# Patient Record
Sex: FEMALE | Race: BLACK OR AFRICAN AMERICAN | NOT HISPANIC OR LATINO | URBAN - NONMETROPOLITAN AREA
[De-identification: names, ages, dates, MRNs, and addresses within clinical notes are randomized per-mention and may not be internally consistent; named-entity substitution may affect disease eponyms.]

---

## 2021-07-08 ENCOUNTER — IMPORTED ENCOUNTER (OUTPATIENT)
Dept: URBAN - NONMETROPOLITAN AREA CLINIC 1 | Facility: CLINIC | Age: 31
End: 2021-07-08

## 2021-07-08 PROBLEM — H52.13: Noted: 2021-07-08

## 2021-07-08 PROBLEM — E11.9: Noted: 2021-07-08

## 2021-07-08 PROCEDURE — 99204 OFFICE O/P NEW MOD 45 MIN: CPT

## 2021-07-08 NOTE — PATIENT DISCUSSION
NIDDM sans Retinopathy Discussed diagnosis with patient. Discussed the risk of diabetic damage of the retina with potential vision loss and the importance of routine follow-up. Emphasized strict blood sugar control. Will send notesContinue to monitor. Myopia OUDiscussed refractive status in detail with patient. No glasses needed at this time. Continue to monitor.

## 2022-04-10 ASSESSMENT — TONOMETRY
OS_IOP_MMHG: 18
OD_IOP_MMHG: 18

## 2022-04-10 ASSESSMENT — VISUAL ACUITY
OD_CC: 20/20
OS_CC: 20/15

## 2022-07-12 ENCOUNTER — ESTABLISHED PATIENT (OUTPATIENT)
Dept: RURAL CLINIC 3 | Facility: CLINIC | Age: 32
End: 2022-07-12

## 2022-07-12 DIAGNOSIS — H52.13: ICD-10-CM

## 2022-07-12 DIAGNOSIS — E11.9: ICD-10-CM

## 2022-07-12 PROCEDURE — 92014 COMPRE OPH EXAM EST PT 1/>: CPT

## 2022-07-12 PROCEDURE — 92015 DETERMINE REFRACTIVE STATE: CPT

## 2022-07-12 ASSESSMENT — VISUAL ACUITY
OD_SC: 20/20
OS_SC: 20/20

## 2022-07-12 ASSESSMENT — TONOMETRY
OD_IOP_MMHG: 18
OS_IOP_MMHG: 18

## 2023-07-18 ENCOUNTER — ESTABLISHED PATIENT (OUTPATIENT)
Dept: RURAL CLINIC 3 | Facility: CLINIC | Age: 33
End: 2023-07-18

## 2023-07-18 DIAGNOSIS — H52.13: ICD-10-CM

## 2023-07-18 PROCEDURE — 92014 COMPRE OPH EXAM EST PT 1/>: CPT

## 2023-07-18 PROCEDURE — 92015 DETERMINE REFRACTIVE STATE: CPT

## 2023-07-18 ASSESSMENT — TONOMETRY
OS_IOP_MMHG: 16
OD_IOP_MMHG: 16

## 2023-07-18 ASSESSMENT — VISUAL ACUITY
OD_SC: 20/20
OS_SC: 20/20

## 2024-09-06 ENCOUNTER — FOLLOW UP (OUTPATIENT)
Dept: RURAL CLINIC 3 | Facility: CLINIC | Age: 34
End: 2024-09-06

## 2024-09-06 DIAGNOSIS — E11.9: ICD-10-CM

## 2024-09-06 PROCEDURE — 99213 OFFICE O/P EST LOW 20 MIN: CPT
